# Patient Record
Sex: FEMALE | Race: WHITE | NOT HISPANIC OR LATINO
[De-identification: names, ages, dates, MRNs, and addresses within clinical notes are randomized per-mention and may not be internally consistent; named-entity substitution may affect disease eponyms.]

---

## 2017-01-03 ENCOUNTER — APPOINTMENT (OUTPATIENT)
Dept: PULMONOLOGY | Facility: CLINIC | Age: 27
End: 2017-01-03

## 2017-01-03 VITALS
HEIGHT: 62 IN | TEMPERATURE: 98 F | BODY MASS INDEX: 20.24 KG/M2 | HEART RATE: 79 BPM | WEIGHT: 110 LBS | OXYGEN SATURATION: 99 %

## 2017-01-03 DIAGNOSIS — R06.02 SHORTNESS OF BREATH: ICD-10-CM

## 2017-01-03 DIAGNOSIS — J98.6 DISORDERS OF DIAPHRAGM: ICD-10-CM

## 2017-01-10 VITALS
SYSTOLIC BLOOD PRESSURE: 110 MMHG | WEIGHT: 101 LBS | HEART RATE: 76 BPM | DIASTOLIC BLOOD PRESSURE: 78 MMHG | BODY MASS INDEX: 18.47 KG/M2

## 2017-01-19 ENCOUNTER — APPOINTMENT (OUTPATIENT)
Dept: NEUROLOGY | Facility: CLINIC | Age: 27
End: 2017-01-19

## 2017-03-15 ENCOUNTER — APPOINTMENT (OUTPATIENT)
Dept: HEART AND VASCULAR | Facility: CLINIC | Age: 27
End: 2017-03-15

## 2017-03-23 ENCOUNTER — APPOINTMENT (OUTPATIENT)
Dept: NEUROLOGY | Facility: CLINIC | Age: 27
End: 2017-03-23

## 2017-03-23 VITALS
WEIGHT: 109 LBS | SYSTOLIC BLOOD PRESSURE: 96 MMHG | OXYGEN SATURATION: 100 % | BODY MASS INDEX: 19.56 KG/M2 | HEART RATE: 74 BPM | TEMPERATURE: 98.1 F | DIASTOLIC BLOOD PRESSURE: 68 MMHG | HEIGHT: 62.5 IN

## 2017-03-23 DIAGNOSIS — I95.1 TACHYCARDIA, UNSPECIFIED: ICD-10-CM

## 2017-03-23 DIAGNOSIS — M53.2X1 SPINAL INSTABILITIES, OCCIPITO-ATLANTO-AXIAL REGION: ICD-10-CM

## 2017-03-23 DIAGNOSIS — R00.0 TACHYCARDIA, UNSPECIFIED: ICD-10-CM

## 2017-03-24 ENCOUNTER — APPOINTMENT (OUTPATIENT)
Dept: NEUROLOGY | Facility: CLINIC | Age: 27
End: 2017-03-24

## 2017-03-24 VITALS
DIASTOLIC BLOOD PRESSURE: 62 MMHG | BODY MASS INDEX: 19.56 KG/M2 | WEIGHT: 109 LBS | OXYGEN SATURATION: 100 % | HEIGHT: 62.5 IN | HEART RATE: 90 BPM | TEMPERATURE: 98.1 F | SYSTOLIC BLOOD PRESSURE: 94 MMHG

## 2017-03-24 DIAGNOSIS — Q79.6 EHLERS-DANLOS SYNDROME: ICD-10-CM

## 2017-03-24 PROBLEM — M53.2X1 CRANIOVERTEBRAL INSTABILITY: Status: ACTIVE | Noted: 2017-03-24

## 2017-03-27 PROBLEM — Q79.6 EHLERS-DANLOS SYNDROME: Status: ACTIVE | Noted: 2017-03-24

## 2017-03-28 ENCOUNTER — APPOINTMENT (OUTPATIENT)
Dept: PULMONOLOGY | Facility: CLINIC | Age: 27
End: 2017-03-28

## 2017-10-30 ENCOUNTER — RECORD ABSTRACTING (OUTPATIENT)
Age: 27
End: 2017-10-30

## 2019-12-09 ENCOUNTER — APPOINTMENT (OUTPATIENT)
Dept: NEUROLOGY | Facility: CLINIC | Age: 29
End: 2019-12-09
Payer: COMMERCIAL

## 2019-12-09 VITALS
DIASTOLIC BLOOD PRESSURE: 75 MMHG | SYSTOLIC BLOOD PRESSURE: 108 MMHG | HEIGHT: 62.5 IN | OXYGEN SATURATION: 100 % | HEART RATE: 84 BPM

## 2019-12-09 DIAGNOSIS — R51 HEADACHE: ICD-10-CM

## 2019-12-09 DIAGNOSIS — R41.3 OTHER AMNESIA: ICD-10-CM

## 2019-12-09 DIAGNOSIS — M47.814 SPONDYLOSIS W/OUT MYELOPATHY OR RADICULOPATHY, THORACIC REGION: ICD-10-CM

## 2019-12-09 DIAGNOSIS — M54.12 RADICULOPATHY, CERVICAL REGION: ICD-10-CM

## 2019-12-09 PROCEDURE — 99214 OFFICE O/P EST MOD 30 MIN: CPT

## 2019-12-09 RX ORDER — CHROMIUM 200 MCG
TABLET ORAL DAILY
Refills: 0 | Status: ACTIVE | COMMUNITY

## 2019-12-09 RX ORDER — ASCORBIC ACID 500 MG
TABLET ORAL DAILY
Refills: 0 | Status: ACTIVE | COMMUNITY

## 2019-12-09 NOTE — HISTORY OF PRESENT ILLNESS
[FreeTextEntry1] : Sensory 1-year-old woman known to me the history of cervical radiculopathy thoracic syrinx as well as headaches.\par \par She has neck pain problems in her arms problems in her legs she also describes a instability of her cervical spine. She has no bowel or any bladder problems no current chest pain. No headaches to a significant degree of the wound which she's had for long periods of time and no seizures.

## 2019-12-09 NOTE — PHYSICAL EXAM
[FreeTextEntry1] : Mentation she is awake alert answering appropriately she is no evidence of aphasia dysarthria he appears to be normal. Gait is symmetrical toes downgoing is wearing a collar and I did not attempt to manipulate her neck. She said she has significant reduced movement of her neck because of instability and does not want to move her neck because of 5 mm instability on the cervical spine. No additional neurological deficits could be noted to her examination.

## 2019-12-09 NOTE — ASSESSMENT
[FreeTextEntry1] : 29-year-old woman with history of cervical instability neck pain discomfort involving her arms and her thoracic lesion. She previously had a white spot on her head and therefore MRI of the brain will be done to see whether it is any change in addition she has a history of lesion in the thoracic spine and therefore that will be repeated she will have an MRI of the cervical spine as her neck pain she will also have the soft tissues look back in her neck as well as x-rays of the cervical spine including flexion extension. She will see Dr. Shaun Delacruz her surgery her

## 2019-12-19 DIAGNOSIS — G95.0 SYRINGOMYELIA AND SYRINGOBULBIA: ICD-10-CM

## 2020-01-21 ENCOUNTER — APPOINTMENT (OUTPATIENT)
Dept: NEUROLOGY | Facility: CLINIC | Age: 30
End: 2020-01-21
Payer: COMMERCIAL

## 2020-01-21 VITALS
DIASTOLIC BLOOD PRESSURE: 78 MMHG | HEIGHT: 62.5 IN | HEART RATE: 90 BPM | SYSTOLIC BLOOD PRESSURE: 109 MMHG | WEIGHT: 110 LBS | OXYGEN SATURATION: 99 % | BODY MASS INDEX: 19.74 KG/M2

## 2020-01-21 DIAGNOSIS — G95.0 SYRINGOMYELIA AND SYRINGOBULBIA: ICD-10-CM

## 2020-01-21 PROCEDURE — 99213 OFFICE O/P EST LOW 20 MIN: CPT

## 2020-01-21 NOTE — HISTORY OF PRESENT ILLNESS
[FreeTextEntry1] : She reports continued problems with her memory problems in her neck for which he is already seen 2 orthopedic specialists she has no obvious seizures to report. She has no change in her musculoskeletal symptoms in her upper and lower extremities. There is no bowel or any bladder difficulty she is seeing a cardiologist who does not feel this a cardiac source to account for white matter changes in her brain

## 2020-01-21 NOTE — PHYSICAL EXAM
[FreeTextEntry1] : On examination she is awake alert answers appropriately I did not attempt to put her next range of motion is no color temperature and skin changes I could appreciate no bruits detectable CCA stereognosis localization extension pretty normal there is no cogwheeling rigidity or bradykinesia no evidence of cranial or carotid bruits could be detectable.

## 2020-01-21 NOTE — ASSESSMENT
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

## 2020-01-31 ENCOUNTER — APPOINTMENT (OUTPATIENT)
Dept: NEUROLOGY | Facility: CLINIC | Age: 30
End: 2020-01-31
Payer: COMMERCIAL

## 2020-01-31 PROCEDURE — 95816 EEG AWAKE AND DROWSY: CPT

## 2020-05-28 ENCOUNTER — RESULT REVIEW (OUTPATIENT)
Age: 30
End: 2020-05-28

## 2021-06-04 ENCOUNTER — NON-APPOINTMENT (OUTPATIENT)
Age: 31
End: 2021-06-04

## 2021-06-08 ENCOUNTER — NON-APPOINTMENT (OUTPATIENT)
Age: 31
End: 2021-06-08

## 2021-06-08 ENCOUNTER — APPOINTMENT (OUTPATIENT)
Dept: COLORECTAL SURGERY | Facility: CLINIC | Age: 31
End: 2021-06-08
Payer: COMMERCIAL

## 2021-06-08 VITALS
TEMPERATURE: 98.8 F | HEART RATE: 85 BPM | HEIGHT: 62.5 IN | WEIGHT: 102 LBS | BODY MASS INDEX: 18.3 KG/M2 | SYSTOLIC BLOOD PRESSURE: 95 MMHG | DIASTOLIC BLOOD PRESSURE: 74 MMHG

## 2021-06-08 DIAGNOSIS — M62.89 OTHER SPECIFIED DISORDERS OF MUSCLE: ICD-10-CM

## 2021-06-08 DIAGNOSIS — K59.09 OTHER CONSTIPATION: ICD-10-CM

## 2021-06-08 PROCEDURE — 99072 ADDL SUPL MATRL&STAF TM PHE: CPT

## 2021-06-08 PROCEDURE — 99203 OFFICE O/P NEW LOW 30 MIN: CPT | Mod: 25

## 2021-06-08 PROCEDURE — 46600 DIAGNOSTIC ANOSCOPY SPX: CPT

## 2021-06-08 NOTE — PHYSICAL EXAM
[FreeTextEntry1] : Medical assistant present for duration of physical examination\par \par General no acute distress, alert and oriented\par Psych calm, pleasant demeanor, responding appropriately to questions\par Cervical spine collar in place\par Nonlabored breathing\par Ambulating without assistance\par Skin normal color and pigment, no visible lesions or rashes\par \par Anorectal Exam:\par Inspection no erythema, induration or fluctuance, no skin excoriation, no fissure, soft right posterior external hemorrhoid not currently inflamed and nonthrombosed\par No evidence of prolapsing tissue with valsalva\par OCTAVIO nontender, no masses palpated, no blood on gloved finger, no stenosis, gluteal muscle recruitment with squeeze, good tone at rest\par \par Procedure: Anoscopy\par \par Pre procedure Diagnosis: hemorrhoids\par Post procedure Diagnosis: hemorrhoids\par Anesthesia: none\par Estimated blood loss: none\par Specimen: none\par Complications: none\par \par Consent obtained. Anoscopy was performed by passing a lighted anoscope with lubricant jelly into the anal canal and the entire anal mucosal surface was inspected. Findings included no fissure, moderate internal hemorrhoids, no visible masses or lesions\par \par Patient tolerated examination and procedure well.\par \par \par

## 2021-06-08 NOTE — HISTORY OF PRESENT ILLNESS
[FreeTextEntry1] : 30 y/o F presents for evaluation bleeding hemorrhoids , referred by Dr. Ester Kaur\par Reports h/o cervical instability, states this is from a chiropractic injury. Review of the chart reveals multiple disc bulging and herniations through out the cervical, thoracic spine, syrinx at T3/4 level, and disc desiccation at L5-S1\par \par H/o IBS-C and SIBO. Being treated with dietary changes and herbal supplements for SIBO currently, will move to additional treatments, including antibiotics if conservative measures fail to treat symptoms. Currently under the care of another GI for this issues\par \par \par H/o hemorrhoids for several years. Previously evaluated by CRS and RBL x1 completed 2-3 years ago with resolution of symptoms for 6 months \par Reports issues with BRBPR on the TP and in the bowl. Reports this was occurring 4 days per week until several weeks ago when an friend recommend Colonosonian root supplement. Since starting the supplement she has noticed resolution of symptoms until she noted a small amount of BRBPR yesterday with BM\par Denies pain or itching \par Saw CRS last year prior to the pandemic beginning she was told by provider she had "too many hemorrhoids... wouldn't’t know where to start", may have offered surgery but patient not interested 2/2 medical issues with spine\par BH:Daily\par Stools are soft and easy to pass. Admits to straining to "activate the muscles" to help evacuate\par c/o incomplete evacuation with almost every BM\par On self exam reports " muscle is closed several inches up" patient is concerned she has a stenosis. Dr. Kaur discussed with patient this may be dyssynergia. Discussed pelvic PT but never gave patient referral. Patient is concerned this is affecting her SIBO as she feels she will not be able to properly shed the bacteria\par Reports adequate dietary fiber intake daily. Currently drinking 3/4 gallon per day\par \par Last colonoscopy completed for bleeding hemorrhoids 6 years ago, internal hemorrhoids noted per patient. Scheduled for colonoscopy in several weeks with Dr. Kaur \par No ASA last 7 days, took Advil 3 days ago

## 2021-06-08 NOTE — ASSESSMENT
[FreeTextEntry1] : Exam findings and diagnosis were discussed at length with patient.\par Continue medical management per GI. Pending colonoscopy, per patient it is scheduled in upcoming weeks.\par Patient has an appointment scheduled with pelvic floor physical therapy, agree with referral for evaluation and treatment.\par Recommend patient consider rubber band ligation for moderate internal hemorrhoids seen on anoscopy today.\par Patient would like to continue with supportive care and will consider rubber banding at a future date if symptoms worsen or persist.\par All questions were answered, patient expressed understanding, and is agreeable to this plan.\par

## 2021-09-20 ENCOUNTER — RESULT REVIEW (OUTPATIENT)
Age: 31
End: 2021-09-20

## 2023-02-16 ENCOUNTER — APPOINTMENT (OUTPATIENT)
Dept: COLORECTAL SURGERY | Facility: CLINIC | Age: 33
End: 2023-02-16
Payer: COMMERCIAL

## 2023-02-16 VITALS
DIASTOLIC BLOOD PRESSURE: 65 MMHG | WEIGHT: 106 LBS | TEMPERATURE: 98.3 F | SYSTOLIC BLOOD PRESSURE: 99 MMHG | BODY MASS INDEX: 19.02 KG/M2 | HEART RATE: 90 BPM | HEIGHT: 62.5 IN

## 2023-02-16 DIAGNOSIS — K64.8 OTHER HEMORRHOIDS: ICD-10-CM

## 2023-02-16 PROCEDURE — 99214 OFFICE O/P EST MOD 30 MIN: CPT

## 2023-02-16 RX ORDER — HYDROCORTISONE 25 MG/G
2.5 CREAM TOPICAL
Qty: 1 | Refills: 5 | Status: ACTIVE | COMMUNITY
Start: 2023-02-16 | End: 1900-01-01

## 2023-02-16 NOTE — ASSESSMENT
[FreeTextEntry1] : Exam findings and diagnosis were discussed at length with patient and her mother. \par Continue bowel regimen per GI.\par Avoid constipation, diarrhea, pushing or straining.\par Medical management, such as hydrocortisone cream, was discussed.\par Recommend sitz baths. \par Natural history of hemorrhoidal flares discussed\par Continue supportive care.\par All questions answered, patient expressed understanding and is agreeable to this plan.\par

## 2023-02-16 NOTE — PHYSICAL EXAM
[FreeTextEntry1] : Medical assistant present for duration of physical examination\par \par General no acute distress, alert and oriented\par Psych responding appropriately to questions\par Cervical spine collar in place\par Nonlabored breathing\par Ambulating without assistance\par Skin normal color and pigment\par \par Anorectal Exam:\par Inspection no cellulitis, soft external hemorrhoids, prolapsed right sided internal hemorrhoid, tender to palpation\par \par Patient declines further internal examination such as digital rectal exam or anoscopy.\par \par \par

## 2023-02-16 NOTE — HISTORY OF PRESENT ILLNESS
[FreeTextEntry1] : 32 y/o F presents for follow up evaluation \par \par H/o bleeding hemorrhoids\par \par Known to GI Dr. Ester Kaur \par H/o cervical instability, states this is from a chiropractic injury. Review of the chart reveals multiple disc bulging and herniations through out the cervical, thoracic spine, syrinx at T3/4 level, and disc desiccation at L5-S1\par \par H/o IBS-C and SIBO. Being treated with dietary changes and herbal supplements for SIBO currently, will move to additional treatments, including antibiotics if conservative measures fail to treat symptoms. Currently under the care of another GI for this issues. Plans to schedule colonoscopy. \par \par Patient seen for initial consultation 6/8/21, H/o bleeding hemorrhoids for several years. Previously seen by another CRS s/p RBL x 1 approximately 3-4 years ago with resolution of symptoms. Discussed role of pelvic PT, however per patient referral was not provided, and would like one to be provided. Reports small amount of BRBPR, hemorrhoidal bleeding improved w/use of colonosonian root supplement. \par \par Exam including anoscopy revealed, soft right posterior external hemorrhoid not inflamed or thrombosed. No evidence of prolapsing tissue with valsalva. No fissure, moderate internal hemorrhoids, no visible masses or lesions. Pt advised to consider rubber band ligation, recommend pursuing pelvic floor PT and keep colonoscopy as scheduled with GI\par \par Patient returns today in follow up \par Pt reports 1.5 years ago she had possible rectal prolapse which she treated w/ Prep H w/ resolution. Reports the hemorrhoid bleeding improved w/ dietary changes and she never completed colonoscopy w/ [facility] or pursued pelvic floor PT\par \par 9 days ago she had soft BM and felt prolapse symptom again and large swollen external hemorrhoids. Reports this was manageable for a few days until she felt something protruding "under the hemorrhoid" and feels an area in the middle of "rock hard" tissue that is painful at slight palpation. Pain level 9/10 which she has been managing w/ Tylenol, Motrin, antianxiety medication and heat pads or warm wash cloths which reduces it down to level 6.5/10. Also c/o throbbing sensation in her vagina and toed. \par She had a BM 3 days ago and felt some relief of pressure. She has not had a BM in the last couple days and is afraid to do so due to fear of worsening pain. She denies fever, chills, rectal bleeding or pus. Admits to some clear mucus discharge.\par She is not taking sitz baths.\par \par \par Last colonoscopy completed for bleeding hemorrhoids 6 years ago, internal hemorrhoids noted per patient. Was scheduled for colonoscopy w/ Dr. Kaur in 2021 but never completed

## 2023-12-21 ENCOUNTER — APPOINTMENT (OUTPATIENT)
Dept: NEUROLOGY | Facility: CLINIC | Age: 33
End: 2023-12-21